# Patient Record
Sex: MALE | Race: WHITE
[De-identification: names, ages, dates, MRNs, and addresses within clinical notes are randomized per-mention and may not be internally consistent; named-entity substitution may affect disease eponyms.]

---

## 2024-07-11 ENCOUNTER — APPOINTMENT (OUTPATIENT)
Dept: UROLOGY | Facility: CLINIC | Age: 59
End: 2024-07-11
Payer: COMMERCIAL

## 2024-07-11 VITALS
DIASTOLIC BLOOD PRESSURE: 76 MMHG | TEMPERATURE: 98.2 F | SYSTOLIC BLOOD PRESSURE: 126 MMHG | HEIGHT: 72 IN | HEART RATE: 87 BPM | OXYGEN SATURATION: 94 % | WEIGHT: 260 LBS | BODY MASS INDEX: 35.21 KG/M2

## 2024-07-11 DIAGNOSIS — E66.3 OVERWEIGHT: ICD-10-CM

## 2024-07-11 DIAGNOSIS — Z80.8 FAMILY HISTORY OF MALIGNANT NEOPLASM OF OTHER ORGANS OR SYSTEMS: ICD-10-CM

## 2024-07-11 DIAGNOSIS — N52.9 MALE ERECTILE DYSFUNCTION, UNSPECIFIED: ICD-10-CM

## 2024-07-11 DIAGNOSIS — N48.0 LEUKOPLAKIA OF PENIS: ICD-10-CM

## 2024-07-11 DIAGNOSIS — R68.82 DECREASED LIBIDO: ICD-10-CM

## 2024-07-11 PROBLEM — Z00.00 ENCOUNTER FOR PREVENTIVE HEALTH EXAMINATION: Status: ACTIVE | Noted: 2024-07-11

## 2024-07-11 PROCEDURE — 99204 OFFICE O/P NEW MOD 45 MIN: CPT

## 2024-07-11 RX ORDER — CLOBETASOL PROPIONATE 0.5 MG/G
0.05 OINTMENT TOPICAL TWICE DAILY
Qty: 1 | Refills: 3 | Status: ACTIVE | COMMUNITY
Start: 2024-07-11 | End: 1900-01-01

## 2024-07-16 ENCOUNTER — NON-APPOINTMENT (OUTPATIENT)
Age: 59
End: 2024-07-16

## 2024-07-16 LAB
ESTRADIOL SERPL-MCNC: 23 PG/ML
LH SERPL-ACNC: 7.1 IU/L
SHBG SERPL-SCNC: 38.6 NMOL/L
TESTOST SERPL-MCNC: 306 NG/DL
TSH SERPL-ACNC: 3.21 UIU/ML

## 2024-08-27 ENCOUNTER — TRANSCRIPTION ENCOUNTER (OUTPATIENT)
Age: 59
End: 2024-08-27

## 2024-09-25 ENCOUNTER — APPOINTMENT (OUTPATIENT)
Dept: UROLOGY | Facility: CLINIC | Age: 59
End: 2024-09-25
Payer: COMMERCIAL

## 2024-09-25 VITALS
SYSTOLIC BLOOD PRESSURE: 102 MMHG | OXYGEN SATURATION: 94 % | DIASTOLIC BLOOD PRESSURE: 66 MMHG | HEIGHT: 72 IN | HEART RATE: 83 BPM | BODY MASS INDEX: 35.21 KG/M2 | WEIGHT: 260 LBS | TEMPERATURE: 97.6 F

## 2024-09-25 PROCEDURE — 99213 OFFICE O/P EST LOW 20 MIN: CPT

## 2024-09-25 NOTE — HISTORY OF PRESENT ILLNESS
[FreeTextEntry1] : Name ABIGAIL SAMUEL MRN [id number]  Aug 23 1965 ------------------------------------------------------------------------------------------------------------------------------------------- Date of First Visit: 24 Referring Provider/PCP: XXXXX / XXXXX ------------------------------------------------------------------------------------------------------------------------------------------- CC: erectile dysfunction, penile pain with sex  History of Present Illness: ABIGAIL SAMUEL is a 58-year-old male with no reported PMH who presents for evaluation of erectile dysfunction. Has been going on for over a year with poor erectile strength and not lasting long. Endorses low libido, but normal energy overall. No signs of depression but dysfunction has negatively affected his mood and marital relationship. Has been putting off evaluation for some time. Also endorses a discoloration/rash on the penis which is painful with friction such as sexual activity, which has also contributed to sexual issues. Has not previously attempted therapy or been evaluated. ------------------------------------------------------------------------------------------------------------------------------------------- Interval History (2024): No change in LS or symptoms since last visit. Has been using clobetasol.

## 2024-09-25 NOTE — ASSESSMENT
[FreeTextEntry1] : Assessment: ABIGAIL SAMUEL is a 58 year old M with erectile dysfunction and painful intercourse due to penile lesion, highly s/f lichen sclerosus. No response to Clobetasol, now 1.5 months.   Plan: -Referral to Dr. Blochin

## 2024-10-10 ENCOUNTER — TRANSCRIPTION ENCOUNTER (OUTPATIENT)
Age: 59
End: 2024-10-10